# Patient Record
Sex: MALE | Race: WHITE | Employment: FULL TIME | URBAN - METROPOLITAN AREA
[De-identification: names, ages, dates, MRNs, and addresses within clinical notes are randomized per-mention and may not be internally consistent; named-entity substitution may affect disease eponyms.]

---

## 2023-05-27 ENCOUNTER — APPOINTMENT (OUTPATIENT)
Dept: GENERAL RADIOLOGY | Age: 53
End: 2023-05-27
Payer: COMMERCIAL

## 2023-05-27 ENCOUNTER — HOSPITAL ENCOUNTER (EMERGENCY)
Age: 53
Discharge: HOME | End: 2023-05-27
Attending: EMERGENCY MEDICINE
Payer: COMMERCIAL

## 2023-05-27 VITALS
SYSTOLIC BLOOD PRESSURE: 118 MMHG | DIASTOLIC BLOOD PRESSURE: 74 MMHG | BODY MASS INDEX: 26.79 KG/M2 | OXYGEN SATURATION: 98 % | WEIGHT: 220 LBS | HEART RATE: 78 BPM | HEIGHT: 76 IN | TEMPERATURE: 98 F | RESPIRATION RATE: 12 BRPM

## 2023-05-27 DIAGNOSIS — S61.022A LACERATION OF LEFT THUMB WITH FOREIGN BODY WITHOUT DAMAGE TO NAIL, INITIAL ENCOUNTER: Primary | ICD-10-CM

## 2023-05-27 PROCEDURE — 90714 TD VACC NO PRESV 7 YRS+ IM: CPT

## 2023-05-27 PROCEDURE — 73140 X-RAY EXAM OF FINGER(S): CPT

## 2023-05-27 PROCEDURE — 99284 EMERGENCY DEPT VISIT MOD MDM: CPT

## 2023-05-27 PROCEDURE — 90471 IMMUNIZATION ADMIN: CPT

## 2023-05-27 PROCEDURE — 12001 RPR S/N/AX/GEN/TRNK 2.5CM/<: CPT

## 2023-05-27 PROCEDURE — 6360000002 HC RX W HCPCS

## 2023-05-27 RX ORDER — PRAZOSIN HYDROCHLORIDE 2 MG/1
4 CAPSULE ORAL DAILY
COMMUNITY
Start: 2023-03-09

## 2023-05-27 RX ORDER — QUETIAPINE FUMARATE 400 MG/1
200 TABLET, FILM COATED ORAL
COMMUNITY
Start: 2023-02-21

## 2023-05-27 RX ORDER — ACAMPROSATE CALCIUM 333 MG/1
666 TABLET, DELAYED RELEASE ORAL 3 TIMES DAILY
Qty: 180 TABLET | Refills: 1 | COMMUNITY
Start: 2023-05-04 | End: 2023-07-03

## 2023-05-27 RX ORDER — TETANUS AND DIPHTHERIA TOXOIDS ADSORBED 2; 2 [LF]/.5ML; [LF]/.5ML
0.5 INJECTION INTRAMUSCULAR
Status: COMPLETED | OUTPATIENT
Start: 2023-05-27 | End: 2023-05-27

## 2023-05-27 RX ORDER — VILAZODONE HYDROCHLORIDE 40 MG/1
1 TABLET ORAL NIGHTLY
COMMUNITY
Start: 2023-02-21

## 2023-05-27 RX ORDER — HYDROXYZINE HYDROCHLORIDE 25 MG/1
25 TABLET, FILM COATED ORAL 4 TIMES DAILY
COMMUNITY
Start: 2023-04-06

## 2023-05-27 RX ORDER — LISINOPRIL AND HYDROCHLOROTHIAZIDE 20; 12.5 MG/1; MG/1
1 TABLET ORAL DAILY
COMMUNITY
Start: 2023-02-21

## 2023-05-27 RX ORDER — PROPRANOLOL HYDROCHLORIDE 10 MG/1
10 TABLET ORAL 2 TIMES DAILY PRN
COMMUNITY
Start: 2023-05-04

## 2023-05-27 RX ADMIN — TETANUS AND DIPHTHERIA TOXOIDS ADSORBED 0.5 ML: 2; 2 INJECTION INTRAMUSCULAR at 16:14

## 2023-05-27 ASSESSMENT — ENCOUNTER SYMPTOMS
SHORTNESS OF BREATH: 0
COUGH: 0
ABDOMINAL PAIN: 0
VOMITING: 0
NAUSEA: 0

## 2023-05-27 ASSESSMENT — PAIN - FUNCTIONAL ASSESSMENT
PAIN_FUNCTIONAL_ASSESSMENT: NONE - DENIES PAIN
PAIN_FUNCTIONAL_ASSESSMENT: 0-10
PAIN_FUNCTIONAL_ASSESSMENT: NONE - DENIES PAIN

## 2023-05-27 ASSESSMENT — PAIN SCALES - GENERAL: PAINLEVEL_OUTOF10: 0

## 2023-05-27 NOTE — ED PROVIDER NOTES
Emergency Department Provider Note       PCP: Celio Ortiz MD   Age: 47 y.o. Sex: male     DISPOSITION Decision To Discharge 05/27/2023 04:57:44 PM       ICD-10-CM    1. Laceration of left thumb with foreign body without damage to nail, initial encounter  S61.022A           Medical Decision Making     Complexity of Problems Addressed:  1 or more acute illnesses that pose a threat to life or bodily function. Data Reviewed and Analyzed:  Category 1:   I independently ordered and reviewed each unique test.     I interpreted the X-rays no underlying left thumb fracture. Category 3: Discussion of management or test interpretation. This patient is a 79-year-old male with no significant past history presents today due to a laceration to his left thumb that he sustained while at work today. Patient presents in no acute distress. He was sent here from urgent care as they state that there is a foreign body seen on x-ray. X-ray here today shows a very small, punctate foreign body in patient's left thumb where his laceration is. I had the patient run his thumb under tap water for several minutes in order to thoroughly wash out the wound. I then placed 7 5-0 simple interrupted sutures. He has normal range of motion of his thumb. X-ray shows no fracture and I am not concerned for ligamentous injury. I was unable to identify the foreign body on physical exam of the patient's wound despite close evaluation I discussed this with the patient. We discussed signs of infection and strict return precautions. All questions were answered. I did update the patient's tetanus shot today. Patient verbalized understanding and agreement with the plan. Risk of Complications and/or Morbidity of Patient Management:  Shared medical decision making was utilized in creating the patients health plan today. ED Course as of 05/27/23 1658   Sat May 27, 2023   1559 Thumb XR IMPRESSION:  1. No acute osseous abnormality.   2.

## 2023-05-27 NOTE — ED NOTES
I have reviewed discharge instructions with the patient. The patient verbalized understanding. Patient left ED via Discharge Method: ambulatory to Home with (insert name of family/friend, self, transport via pov). Opportunity for questions and clarification provided. Patient given 0 scripts. To continue your aftercare when you leave the hospital, you may receive an automated call from our care team to check in on how you are doing. This is a free service and part of our promise to provide the best care and service to meet your aftercare needs.  If you have questions, or wish to unsubscribe from this service please call [phone]. Thank you for Choosing our [address] [place] [address] Emergency Department.         Sara White RN  05/27/23 0917

## 2023-05-27 NOTE — ED TRIAGE NOTES
Patient cut left thumb while working today, seen at urgent care and was told to come to ED as there was a foreign body present on the x ray

## 2023-05-27 NOTE — DISCHARGE INSTRUCTIONS
Keep the stitches clean and dry for the first 36 hours without submerging in water. Keep an eye out for signs of infection such as redness, swelling, abnormal discharge, fever, or chills. If you experience any of these, return here for further evaluation. Have your stitches removed in 7 to 10 days. You may come here to have them removed. Return for any new or worsening symptoms in any way.